# Patient Record
Sex: FEMALE | Race: WHITE | NOT HISPANIC OR LATINO | URBAN - METROPOLITAN AREA
[De-identification: names, ages, dates, MRNs, and addresses within clinical notes are randomized per-mention and may not be internally consistent; named-entity substitution may affect disease eponyms.]

---

## 2021-10-19 ENCOUNTER — OFFICE VISIT (OUTPATIENT)
Dept: URBAN - METROPOLITAN AREA CLINIC 121 | Facility: CLINIC | Age: 66
End: 2021-10-19

## 2021-11-03 ENCOUNTER — APPOINTMENT (RX ONLY)
Dept: URBAN - METROPOLITAN AREA CLINIC 147 | Facility: CLINIC | Age: 66
Setting detail: DERMATOLOGY
End: 2021-11-03

## 2021-11-03 DIAGNOSIS — L82.1 OTHER SEBORRHEIC KERATOSIS: ICD-10-CM

## 2021-11-03 PROCEDURE — ? PRESCRIPTION SAMPLES PROVIDED

## 2021-11-03 PROCEDURE — 99202 OFFICE O/P NEW SF 15 MIN: CPT

## 2021-11-03 PROCEDURE — ? COUNSELING

## 2021-11-03 PROCEDURE — ? ADDITIONAL NOTES

## 2021-11-03 ASSESSMENT — LOCATION SIMPLE DESCRIPTION DERM
LOCATION SIMPLE: RIGHT CHEEK
LOCATION SIMPLE: RIGHT HAND

## 2021-11-03 ASSESSMENT — LOCATION ZONE DERM
LOCATION ZONE: FACE
LOCATION ZONE: HAND

## 2021-11-03 ASSESSMENT — LOCATION DETAILED DESCRIPTION DERM
LOCATION DETAILED: RIGHT RADIAL DORSAL HAND
LOCATION DETAILED: RIGHT SUPERIOR LATERAL MALAR CHEEK

## 2022-02-28 ENCOUNTER — OFFICE VISIT (OUTPATIENT)
Dept: URBAN - METROPOLITAN AREA CLINIC 63 | Facility: CLINIC | Age: 67
End: 2022-02-28

## 2022-04-05 ENCOUNTER — OFFICE VISIT (OUTPATIENT)
Dept: URBAN - METROPOLITAN AREA SURGERY CENTER 4 | Facility: SURGERY CENTER | Age: 67
End: 2022-04-05

## 2022-04-19 ENCOUNTER — OFFICE VISIT (OUTPATIENT)
Dept: URBAN - METROPOLITAN AREA CLINIC 63 | Facility: CLINIC | Age: 67
End: 2022-04-19

## 2022-05-20 ENCOUNTER — OFFICE VISIT (OUTPATIENT)
Dept: URBAN - METROPOLITAN AREA SURGERY CENTER 4 | Facility: SURGERY CENTER | Age: 67
End: 2022-05-20

## 2022-06-01 ENCOUNTER — OFFICE VISIT (OUTPATIENT)
Dept: URBAN - METROPOLITAN AREA SURGERY CENTER 4 | Facility: SURGERY CENTER | Age: 67
End: 2022-06-01

## 2022-06-03 ENCOUNTER — OFFICE VISIT (OUTPATIENT)
Dept: URBAN - METROPOLITAN AREA CLINIC 63 | Facility: CLINIC | Age: 67
End: 2022-06-03

## 2022-06-15 ENCOUNTER — OFFICE VISIT (OUTPATIENT)
Dept: URBAN - METROPOLITAN AREA CLINIC 63 | Facility: CLINIC | Age: 67
End: 2022-06-15

## 2022-06-27 ENCOUNTER — OFFICE VISIT (OUTPATIENT)
Dept: URBAN - METROPOLITAN AREA CLINIC 63 | Facility: CLINIC | Age: 67
End: 2022-06-27

## 2022-07-09 ENCOUNTER — TELEPHONE ENCOUNTER (OUTPATIENT)
Dept: URBAN - METROPOLITAN AREA CLINIC 121 | Facility: CLINIC | Age: 67
End: 2022-07-09

## 2022-07-09 RX ORDER — PROMETHAZINE HYDROCHLORIDE 25 MG/1
TABLET ORAL AS NEEDED
Refills: 0 | OUTPATIENT
Start: 2020-02-17 | End: 2022-02-28

## 2022-07-09 RX ORDER — BISMUTH SUBSALICYLATE 262MG/15ML
SUSPENSION, ORAL (FINAL DOSE FORM) ORAL AS NEEDED
Refills: 0 | OUTPATIENT
Start: 2020-02-17 | End: 2022-02-28

## 2022-07-09 RX ORDER — OMEPRAZOLE 20 MG/1
CAPSULE, DELAYED RELEASE ORAL ONCE A DAY
Refills: 0 | OUTPATIENT
Start: 2020-02-17 | End: 2022-02-28

## 2022-07-10 ENCOUNTER — TELEPHONE ENCOUNTER (OUTPATIENT)
Dept: URBAN - METROPOLITAN AREA CLINIC 121 | Facility: CLINIC | Age: 67
End: 2022-07-10

## 2022-07-10 RX ORDER — HYOSCYAMINE SULFATE 0.12 MG/1
TAKE AS DIRECTED TAKE 1-2 TABS, SL, UP TO 3 TIMES DAILY, AS NEEDED FOR ABDOMINAL PAIN/CRAMPING TABLET, ORALLY DISINTEGRATING ORAL TAKE AS DIRECTED
Refills: 1 | Status: ACTIVE | COMMUNITY
Start: 2020-02-17

## 2022-07-10 RX ORDER — HYDROCORTISONE ACETATE 25 MG/1
INSERT ONE SUPPOSITORY PER RECTUM TWICE DAILY AS NEEDED SUPPOSITORY RECTAL TWICE A DAY
Refills: 1 | Status: ACTIVE | COMMUNITY
Start: 2010-08-27

## 2022-07-10 RX ORDER — LOSARTAN POTASSIUM 50 MG/1
TABLET, FILM COATED ORAL ONCE A DAY
Refills: 0 | Status: ACTIVE | COMMUNITY
Start: 2022-02-28

## 2022-07-10 RX ORDER — HYDROCORTISONE ACETATE 25 MG/1
TWICE A DAY INSERT ONE SUPP, PR, QAM AND QHS AFTER BOWEL EVACUATION SUPPOSITORY RECTAL TWICE A DAY
Refills: 2 | Status: ACTIVE | COMMUNITY
Start: 2020-08-03

## 2022-07-10 RX ORDER — PROMETHAZINE HYDROCHLORIDE 12.5 MG/1
TABLET ORAL AS NEEDED
Refills: 0 | Status: ACTIVE | COMMUNITY
Start: 2022-02-28

## 2022-07-10 RX ORDER — HYDROCORTISONE ACETATE 25 MG/1
INSERT ONE SUPPOSITORY PER RECTUM TWICE DAILY AS NEEDED SUPPOSITORY RECTAL TWICE A DAY
Refills: 0 | Status: ACTIVE | COMMUNITY
Start: 2012-11-28

## 2022-07-10 RX ORDER — OMEPRAZOLE 20 MG/1
TABLET, ORALLY DISINTEGRATING, DELAYED RELEASE ORAL ONCE A DAY
Refills: 0 | Status: ACTIVE | COMMUNITY
Start: 2022-02-28

## 2022-08-08 ENCOUNTER — APPOINTMENT (RX ONLY)
Dept: URBAN - METROPOLITAN AREA CLINIC 147 | Facility: CLINIC | Age: 67
Setting detail: DERMATOLOGY
End: 2022-08-08

## 2022-08-08 DIAGNOSIS — L81.5 LEUKODERMA, NOT ELSEWHERE CLASSIFIED: ICD-10-CM

## 2022-08-08 PROCEDURE — 99212 OFFICE O/P EST SF 10 MIN: CPT

## 2022-08-08 PROCEDURE — ? COUNSELING

## 2022-08-08 ASSESSMENT — LOCATION DETAILED DESCRIPTION DERM
LOCATION DETAILED: LEFT POSTERIOR SHOULDER
LOCATION DETAILED: RIGHT POSTERIOR SHOULDER
LOCATION DETAILED: UPPER STERNUM
LOCATION DETAILED: RIGHT PROXIMAL PRETIBIAL REGION
LOCATION DETAILED: LEFT DISTAL PRETIBIAL REGION

## 2022-08-08 ASSESSMENT — LOCATION SIMPLE DESCRIPTION DERM
LOCATION SIMPLE: CHEST
LOCATION SIMPLE: LEFT PRETIBIAL REGION
LOCATION SIMPLE: LEFT SHOULDER
LOCATION SIMPLE: RIGHT PRETIBIAL REGION
LOCATION SIMPLE: RIGHT SHOULDER

## 2022-08-08 ASSESSMENT — LOCATION ZONE DERM
LOCATION ZONE: ARM
LOCATION ZONE: TRUNK
LOCATION ZONE: LEG

## 2022-11-17 ENCOUNTER — APPOINTMENT (RX ONLY)
Dept: URBAN - METROPOLITAN AREA CLINIC 147 | Facility: CLINIC | Age: 67
Setting detail: DERMATOLOGY
End: 2022-11-17

## 2022-11-17 DIAGNOSIS — L98.8 OTHER SPECIFIED DISORDERS OF THE SKIN AND SUBCUTANEOUS TISSUE: ICD-10-CM

## 2022-11-17 DIAGNOSIS — D18.0 HEMANGIOMA: ICD-10-CM

## 2022-11-17 DIAGNOSIS — L82.1 OTHER SEBORRHEIC KERATOSIS: ICD-10-CM

## 2022-11-17 DIAGNOSIS — L81.4 OTHER MELANIN HYPERPIGMENTATION: ICD-10-CM

## 2022-11-17 DIAGNOSIS — D22 MELANOCYTIC NEVI: ICD-10-CM

## 2022-11-17 PROBLEM — D48.5 NEOPLASM OF UNCERTAIN BEHAVIOR OF SKIN: Status: ACTIVE | Noted: 2022-11-17

## 2022-11-17 PROBLEM — D18.01 HEMANGIOMA OF SKIN AND SUBCUTANEOUS TISSUE: Status: ACTIVE | Noted: 2022-11-17

## 2022-11-17 PROBLEM — D22.5 MELANOCYTIC NEVI OF TRUNK: Status: ACTIVE | Noted: 2022-11-17

## 2022-11-17 PROCEDURE — 99213 OFFICE O/P EST LOW 20 MIN: CPT | Mod: 25

## 2022-11-17 PROCEDURE — ? FULL BODY SKIN EXAM

## 2022-11-17 PROCEDURE — ? COUNSELING

## 2022-11-17 PROCEDURE — ? BIOPSY BY SHAVE METHOD

## 2022-11-17 PROCEDURE — 11102 TANGNTL BX SKIN SINGLE LES: CPT

## 2022-11-17 PROCEDURE — ? ADDITIONAL NOTES

## 2022-11-17 PROCEDURE — ? TREATMENT REGIMEN

## 2022-11-17 ASSESSMENT — LOCATION DETAILED DESCRIPTION DERM
LOCATION DETAILED: RIGHT MEDIAL PLANTAR 1ST TOE
LOCATION DETAILED: LEFT ANTERIOR PROXIMAL THIGH
LOCATION DETAILED: RIGHT MEDIAL UPPER BACK
LOCATION DETAILED: PERIUMBILICAL SKIN
LOCATION DETAILED: INFERIOR THORACIC SPINE
LOCATION DETAILED: LEFT VENTRAL PROXIMAL FOREARM
LOCATION DETAILED: RIGHT ANTERIOR SHOULDER
LOCATION DETAILED: RIGHT VENTRAL PROXIMAL FOREARM

## 2022-11-17 ASSESSMENT — LOCATION SIMPLE DESCRIPTION DERM
LOCATION SIMPLE: RIGHT FOREARM
LOCATION SIMPLE: UPPER BACK
LOCATION SIMPLE: RIGHT SHOULDER
LOCATION SIMPLE: LEFT THIGH
LOCATION SIMPLE: LEFT FOREARM
LOCATION SIMPLE: PLANTAR SURFACE OF RIGHT 1ST TOE
LOCATION SIMPLE: RIGHT UPPER BACK
LOCATION SIMPLE: ABDOMEN

## 2022-11-17 ASSESSMENT — LOCATION ZONE DERM
LOCATION ZONE: LEG
LOCATION ZONE: TOE
LOCATION ZONE: ARM
LOCATION ZONE: TRUNK

## 2022-11-17 NOTE — PROCEDURE: ADDITIONAL NOTES
Render Risk Assessment In Note?: no
Detail Level: Simple
Additional Notes: Small fissure noted. Discussed importance of keeping feet moisturized with Vaseline and to RTO if not improved.

## 2023-06-29 ENCOUNTER — APPOINTMENT (RX ONLY)
Dept: URBAN - METROPOLITAN AREA CLINIC 147 | Facility: CLINIC | Age: 68
Setting detail: DERMATOLOGY
End: 2023-06-29

## 2023-06-29 DIAGNOSIS — L20.89 OTHER ATOPIC DERMATITIS: ICD-10-CM

## 2023-06-29 PROBLEM — L30.9 DERMATITIS, UNSPECIFIED: Status: ACTIVE | Noted: 2023-06-29

## 2023-06-29 PROCEDURE — ? COUNSELING

## 2023-06-29 PROCEDURE — 99213 OFFICE O/P EST LOW 20 MIN: CPT

## 2023-06-29 PROCEDURE — ? PRESCRIPTION MEDICATION MANAGEMENT

## 2023-06-29 PROCEDURE — ? PRESCRIPTION

## 2023-06-29 PROCEDURE — ? MEDICATION COUNSELING

## 2023-06-29 PROCEDURE — ? ADDITIONAL NOTES

## 2023-06-29 RX ORDER — TRIAMCINOLONE ACETONIDE 1 MG/G
CREAM TOPICAL BID
Qty: 80 | Refills: 0 | Status: ERX | COMMUNITY
Start: 2023-06-29

## 2023-06-29 RX ADMIN — TRIAMCINOLONE ACETONIDE: 1 CREAM TOPICAL at 00:00

## 2023-06-29 ASSESSMENT — LOCATION ZONE DERM
LOCATION ZONE: TRUNK
LOCATION ZONE: AXILLAE

## 2023-06-29 ASSESSMENT — LOCATION SIMPLE DESCRIPTION DERM
LOCATION SIMPLE: RIGHT LOWER BACK
LOCATION SIMPLE: RIGHT AXILLARY VAULT
LOCATION SIMPLE: ABDOMEN

## 2023-06-29 ASSESSMENT — LOCATION DETAILED DESCRIPTION DERM
LOCATION DETAILED: PERIUMBILICAL SKIN
LOCATION DETAILED: RIGHT INFERIOR MEDIAL MIDBACK
LOCATION DETAILED: RIGHT AXILLARY VAULT

## 2023-06-29 NOTE — PROCEDURE: ADDITIONAL NOTES
Detail Level: Simple
Render Risk Assessment In Note?: no
Additional Notes: Pt had two injections of Prolia and recently noticed erythematous papules and scaly patches x 1 week. Discussed possibility of drug eruption. Pt declined bx at todays visit but will consider if not resolved at next visit. Advised to rto if rash worsens prior to next scheduled visit. Pt denies facial, tongue or throat swelling. Pt denies gi symptoms, trouble breathing or swallowing.

## 2023-06-29 NOTE — PROCEDURE: PRESCRIPTION MEDICATION MANAGEMENT
Render In Strict Bullet Format?: No
Initiate Treatment: Triamcinolone bid x 2 weeks
Discontinue Regimen: Clotrimazole-betamethasone cream
Detail Level: Zone

## 2023-06-29 NOTE — PROCEDURE: MEDICATION COUNSELING
Xelaldoz Pregnancy And Lactation Text: This medication is Pregnancy Category D and is not considered safe during pregnancy.  The risk during breast feeding is also uncertain.

## 2023-07-20 ENCOUNTER — APPOINTMENT (RX ONLY)
Dept: URBAN - METROPOLITAN AREA CLINIC 147 | Facility: CLINIC | Age: 68
Setting detail: DERMATOLOGY
End: 2023-07-20

## 2023-07-20 DIAGNOSIS — L20.89 OTHER ATOPIC DERMATITIS: ICD-10-CM

## 2023-07-20 PROBLEM — L30.9 DERMATITIS, UNSPECIFIED: Status: ACTIVE | Noted: 2023-07-20

## 2023-07-20 PROCEDURE — ? PRESCRIPTION MEDICATION MANAGEMENT

## 2023-07-20 PROCEDURE — 11102 TANGNTL BX SKIN SINGLE LES: CPT

## 2023-07-20 PROCEDURE — ? BIOPSY BY SHAVE METHOD

## 2023-07-20 PROCEDURE — ? PRESCRIPTION

## 2023-07-20 PROCEDURE — ? COUNSELING

## 2023-07-20 RX ORDER — KETOCONAZOLE 20 MG/G
CREAM TOPICAL
Qty: 30 | Refills: 0 | Status: ERX | COMMUNITY
Start: 2023-07-20

## 2023-07-20 RX ADMIN — KETOCONAZOLE: 20 CREAM TOPICAL at 00:00

## 2023-07-20 ASSESSMENT — LOCATION DETAILED DESCRIPTION DERM
LOCATION DETAILED: PERIUMBILICAL SKIN
LOCATION DETAILED: LEFT SUPERIOR LATERAL MIDBACK
LOCATION DETAILED: RIGHT INFERIOR MEDIAL MIDBACK
LOCATION DETAILED: RIGHT AXILLARY VAULT

## 2023-07-20 ASSESSMENT — LOCATION ZONE DERM
LOCATION ZONE: TRUNK
LOCATION ZONE: AXILLAE

## 2023-07-20 ASSESSMENT — LOCATION SIMPLE DESCRIPTION DERM
LOCATION SIMPLE: RIGHT LOWER BACK
LOCATION SIMPLE: ABDOMEN
LOCATION SIMPLE: LEFT LOWER BACK
LOCATION SIMPLE: RIGHT AXILLARY VAULT

## 2023-07-20 NOTE — PROCEDURE: PRESCRIPTION MEDICATION MANAGEMENT
Render In Strict Bullet Format?: No
Initiate Treatment: Triamcinolone bid x 2 weeks\\nKetoconazole bid x 2 weeks
Discontinue Regimen: Clotrimazole-betamethasone cream
Detail Level: Zone

## 2023-08-01 RX ORDER — KETOCONAZOLE 20 MG/G
CREAM TOPICAL
Qty: 30 | Refills: 0 | Status: ERX

## 2023-08-01 RX ORDER — TRIAMCINOLONE ACETONIDE 1 MG/G
CREAM TOPICAL BID
Qty: 80 | Refills: 0 | Status: ERX

## 2023-08-10 ENCOUNTER — APPOINTMENT (RX ONLY)
Dept: URBAN - METROPOLITAN AREA CLINIC 147 | Facility: CLINIC | Age: 68
Setting detail: DERMATOLOGY
End: 2023-08-10

## 2023-08-10 DIAGNOSIS — L20.89 OTHER ATOPIC DERMATITIS: ICD-10-CM

## 2023-08-10 PROCEDURE — ? ADDITIONAL NOTES

## 2023-08-10 PROCEDURE — ? MEDICATION COUNSELING

## 2023-08-10 PROCEDURE — ? PRESCRIPTION MEDICATION MANAGEMENT

## 2023-08-10 PROCEDURE — ? COUNSELING

## 2023-08-10 PROCEDURE — 99213 OFFICE O/P EST LOW 20 MIN: CPT

## 2023-08-10 PROCEDURE — ? PATHOLOGY DISCUSSION

## 2023-08-10 PROCEDURE — ? PRESCRIPTION

## 2023-08-10 RX ORDER — BETAMETHASONE DIPROPIONATE 0.5 MG/G
CREAM TOPICAL
Qty: 45 | Refills: 0 | Status: ERX | COMMUNITY
Start: 2023-08-10

## 2023-08-10 RX ADMIN — BETAMETHASONE DIPROPIONATE: 0.5 CREAM TOPICAL at 00:00

## 2023-08-10 ASSESSMENT — LOCATION ZONE DERM: LOCATION ZONE: TRUNK

## 2023-08-10 ASSESSMENT — LOCATION DETAILED DESCRIPTION DERM: LOCATION DETAILED: LEFT SUPERIOR LATERAL MIDBACK

## 2023-08-10 ASSESSMENT — LOCATION SIMPLE DESCRIPTION DERM: LOCATION SIMPLE: LEFT LOWER BACK

## 2023-08-10 NOTE — PROCEDURE: PRESCRIPTION MEDICATION MANAGEMENT
Discontinue Regimen: Ketoconazole cream / Collagen / Biotin
Detail Level: Zone
Initiate Treatment: Xyzal / Betamethasone bid x 2 weeks
Render In Strict Bullet Format?: No

## 2023-08-10 NOTE — PROCEDURE: ADDITIONAL NOTES
Render Risk Assessment In Note?: no
Detail Level: Simple
Additional Notes: Pt states she started taking collagen pills few days before rash started. Advised to discontinue \\nDiscussed omeprazole also could causing reaction, advised to not d/c without speaking to prescriber first. \\nDiscussed options of prednisone, will continue with topicals if no improvement will consider prednisone.\\n\\nRecommended allergy testing if not improved with medication changes

## 2024-11-05 ENCOUNTER — OFFICE VISIT (OUTPATIENT)
Dept: URBAN - METROPOLITAN AREA CLINIC 63 | Facility: CLINIC | Age: 69
End: 2024-11-05
Payer: MEDICARE

## 2024-11-05 VITALS
WEIGHT: 125 LBS | SYSTOLIC BLOOD PRESSURE: 130 MMHG | HEIGHT: 60 IN | HEART RATE: 94 BPM | OXYGEN SATURATION: 99 % | DIASTOLIC BLOOD PRESSURE: 90 MMHG | BODY MASS INDEX: 24.54 KG/M2 | TEMPERATURE: 97.8 F

## 2024-11-05 DIAGNOSIS — K21.9 GASTROESOPHAGEAL REFLUX DISEASE, UNSPECIFIED WHETHER ESOPHAGITIS PRESENT: ICD-10-CM

## 2024-11-05 PROBLEM — 235595009: Status: ACTIVE | Noted: 2024-11-05

## 2024-11-05 PROCEDURE — 99213 OFFICE O/P EST LOW 20 MIN: CPT

## 2024-11-05 RX ORDER — PANTOPRAZOLE SODIUM 40 MG/1
1 TABLET 1/2 TO 1 HOUR BEFORE MORNING MEAL TABLET, DELAYED RELEASE ORAL ONCE A DAY
Status: ACTIVE | COMMUNITY

## 2024-11-05 RX ORDER — FAMOTIDINE 20 MG/1
1 TABLET AT BEDTIME AS NEEDED TABLET, FILM COATED ORAL ONCE A DAY
Status: ACTIVE | COMMUNITY

## 2024-11-05 RX ORDER — RISEDRONATE SODIUM 35 MG/1
TAKE 1 TABLET BY MOUTH ONCE A WEEK 30 MINUTES BEFORE FIRST FOOD/DRINK/MEDICATION. AVOID LYING DOWN FOR 30 MINUTES TABLET, FILM COATED ORAL
Qty: 12 EACH | Refills: 0 | Status: ACTIVE | COMMUNITY

## 2024-11-05 RX ORDER — SUCRALFATE 1 G/1
1 TABLET ON AN EMPTY STOMACH TABLET ORAL TWICE A DAY
Status: ON HOLD | COMMUNITY

## 2024-11-05 RX ORDER — PROMETHAZINE HYDROCHLORIDE 25 MG/1
1 TABLET AS NEEDED TABLET ORAL
Status: ON HOLD | COMMUNITY

## 2024-11-05 RX ORDER — OMEPRAZOLE 20 MG/1
TAKE 1 CAPSULE BY MOUTH ONCE DAILY CAPSULE, DELAYED RELEASE ORAL
Qty: 90 EACH | Refills: 3 | Status: ON HOLD | COMMUNITY

## 2024-11-05 RX ORDER — LOSARTAN POTASSIUM 50 MG/1
TAKE 1 TABLET BY MOUTH ONCE DAILY TABLET, FILM COATED ORAL
Qty: 90 EACH | Refills: 3 | Status: ACTIVE | COMMUNITY

## 2024-11-05 NOTE — PHYSICAL EXAM CONSTITUTIONAL:
well developed, well nourished , in no acute distress , ambulating without difficulty , normal communication ability PATIENT APPEARED ANXIOUS AND IN MILD DISTRESS DURING INTERVIEW

## 2024-11-05 NOTE — HPI-TODAY'S VISIT:
This is a very pleasant 69-year-old female who presents to the office with a chief complaint of "acid reflux".  Past medical history is significant for hypertension, osteoarthritis, GERD.  Past surgical history is significant for appendectomy, colonoscopy,  x2, ORIF of the wrist, lateral internal sphincterotomy, excision of rectal lesion.  Family history is significant for maternal ovarian cancer, matneral grandmother with CRC and paternal colon polyps. Last colonoscopy 2022, Dr. Santos, repeat colonoscopy is deferred due to age. Patient is endoscopy naive. Cardiologist: none. . Patient was last seen in the office on 3/2/2022 with JR Cox for lower abdominal pain and screening colonoscopy.  Patient explained at that visit her last complete colonoscopy was in  which was normal except for tortuosity.  She relates a family history of colon cancer in a maternal grandmother and paternal history of colon polyps. . Patient presents today explaining that she is in significant pain and looked visibly distressed during the interview.  She explained  that she recently saw her PCP who diagnosed her with a she explains that she has been having reflux that will cause her epigastric pain as well as heartburn UTI and gave her empiric antibiotics.  She explained in the visit that she began taking these antibiotics and was significantly better, however took only a few doses before discontinuing.  She explains that the same pain has now returned and seems to have gotten worse.  I explained to the patient that she should present to the ED to have evaluation, she denied EMS services at today's visit. . Patient explains that she has been having heartburn as well as epigastric pain that radiates to her back.  She explains she has been under significant stress since her  passed away several months ago, and explains she has "not been the same since".  She explained her PCP gave her famotidine 20 mg and omeprazole 40 mg to take daily which has significantly helped her symptoms, she has been taking both for a few weeks.  I explained to the patient she should take a full 2-month trial of omeprazole 40mg daily and we can meet up in a few weeks to assess for improvement.  She explains she will present to the ED for evaluation for UTI after our visit. . Patient denies belching, bloating, constipation, diarrhea, dysphagia, pyrosis, melena, hematochezia, hematemesis, nausea, vomiting, BRBPR, and unintentional weight loss.

## 2024-11-14 ENCOUNTER — TELEPHONE ENCOUNTER (OUTPATIENT)
Dept: URBAN - METROPOLITAN AREA CLINIC 7 | Facility: CLINIC | Age: 69
End: 2024-11-14

## 2024-11-15 ENCOUNTER — LAB OUTSIDE AN ENCOUNTER (OUTPATIENT)
Dept: URBAN - METROPOLITAN AREA CLINIC 63 | Facility: CLINIC | Age: 69
End: 2024-11-15

## 2024-11-15 ENCOUNTER — DASHBOARD ENCOUNTERS (OUTPATIENT)
Age: 69
End: 2024-11-15

## 2024-11-15 ENCOUNTER — OFFICE VISIT (OUTPATIENT)
Dept: URBAN - METROPOLITAN AREA CLINIC 63 | Facility: CLINIC | Age: 69
End: 2024-11-15
Payer: MEDICARE

## 2024-11-15 VITALS
HEART RATE: 98 BPM | TEMPERATURE: 97.9 F | BODY MASS INDEX: 24.74 KG/M2 | HEIGHT: 60 IN | SYSTOLIC BLOOD PRESSURE: 140 MMHG | WEIGHT: 126 LBS | DIASTOLIC BLOOD PRESSURE: 100 MMHG | OXYGEN SATURATION: 98 %

## 2024-11-15 DIAGNOSIS — K21.9 GASTROESOPHAGEAL REFLUX DISEASE, UNSPECIFIED WHETHER ESOPHAGITIS PRESENT: ICD-10-CM

## 2024-11-15 DIAGNOSIS — R10.13 EPIGASTRIC ABDOMINAL PAIN: ICD-10-CM

## 2024-11-15 PROCEDURE — 99213 OFFICE O/P EST LOW 20 MIN: CPT

## 2024-11-15 RX ORDER — FAMOTIDINE 20 MG/1
1 TABLET AT BEDTIME AS NEEDED TABLET, FILM COATED ORAL ONCE A DAY
Status: ACTIVE | COMMUNITY

## 2024-11-15 RX ORDER — LOSARTAN POTASSIUM 50 MG/1
TAKE 1 TABLET BY MOUTH ONCE DAILY TABLET, FILM COATED ORAL
Qty: 90 EACH | Refills: 3 | Status: ACTIVE | COMMUNITY

## 2024-11-15 RX ORDER — RISEDRONATE SODIUM 35 MG/1
TAKE 1 TABLET BY MOUTH ONCE A WEEK 30 MINUTES BEFORE FIRST FOOD/DRINK/MEDICATION. AVOID LYING DOWN FOR 30 MINUTES TABLET, FILM COATED ORAL
Qty: 12 EACH | Refills: 0 | Status: ACTIVE | COMMUNITY

## 2024-11-15 RX ORDER — PROMETHAZINE HYDROCHLORIDE 25 MG/1
1 TABLET AS NEEDED TABLET ORAL
Status: ON HOLD | COMMUNITY

## 2024-11-15 RX ORDER — OMEPRAZOLE 20 MG/1
TAKE 1 CAPSULE BY MOUTH ONCE DAILY CAPSULE, DELAYED RELEASE ORAL
Qty: 90 EACH | Refills: 3 | Status: ON HOLD | COMMUNITY

## 2024-11-15 RX ORDER — PANTOPRAZOLE SODIUM 40 MG/1
1 TABLET 1/2 TO 1 HOUR BEFORE MORNING MEAL TABLET, DELAYED RELEASE ORAL ONCE A DAY
Status: ACTIVE | COMMUNITY

## 2024-11-15 RX ORDER — SUCRALFATE 1 G/1
1 TABLET ON AN EMPTY STOMACH TABLET ORAL TWICE A DAY
Status: ON HOLD | COMMUNITY

## 2024-11-15 NOTE — HPI-TODAY'S VISIT:
This is a very pleasant 69-year-old female who presents to the office with a chief complaint of "GERD".  Past medical history is significant for hypertension, osteoarthritis, GERD.  Past surgical history is significant for appendectomy, colonoscopy,  x2, ORIF of the wrist, lateral internal sphincterotomy, excision of rectal lesion.  Family history is significant for maternal ovarian cancer, matneral grandmother with CRC and paternal colon polyps. Last colonoscopy 2022, Dr. Santos, no recall indicated due to age Patient is endoscopy naive. Cardiologist: none. . Patient was last seen in the office on 2024 for reflux.  At that visit patient was looking visibly distressed in the interview, she explained that her PCP had diagnosed her with a presumed UTI and had given her empiric antibiotics, and the visit she explains she was feeling significantly better after the fevers few doses, so she discontinued it.  She explains that the pain has returned during the interview, I recommended the patient present to the ED for evaluation, patient denied EMS services at the visit.  She explains that she has been having heartburn as well as epigastric pain that radiates to her back, she notes significant stress since her  passed away several months ago.  PCP had given her famotidine 20 mg and omeprazole 40 mg daily which improved her symptoms, I explained we should try a full 2-month trial of omeprazole 40mg daily and the famotidine 20mg daily. Patient was amenable. . Patient presents today explaining that she went to [facility] at last visit for UTI, although she explains that her pain from her UTI has completely resolved. She continues to have burning in her chest, and on certain days is being woken up with nausea and dry heaving.  She explains that due to the dry heaving she is now having shoulder and back pain.  She explains she has been taking the pantoprazole 40mg in the AM and famotidine 20mg at night, but she is still having symptoms.  She explains that the symptoms become noticeably worse when she eats pasta, chili or anything tomato-based at dinner, as she will then have symptoms early the next morning of nausea and vomiting.  I explained to the patient that because her GERD symptoms have been refractory to high-dose PPI and famotidine daily treatment in 2024, EGD would be indicated.  Of note, patient still explains that she has been experiencing grief and anxiety due to her 's passing earlier this year, she explains that over holidays her stress becomes worse.  I explained to the patient that because stress can make GERD worse it may be helpful to establish with a talk therapist, I gave her recommendation in office today.  Patient is agreeable to an EGD for evaluation.  In addition I increased her dose to pantoprazole 40mg BID and famotidine 20mg as needed, to a max of BID. CT A&P also ordered. . Patient denies abdominal pain, belching, bloating, constipation, diarrhea, dysphagia, melena, hematochezia, hematemesis, BRBPR, and unintentional weight loss.

## 2024-11-19 ENCOUNTER — TELEPHONE ENCOUNTER (OUTPATIENT)
Dept: URBAN - METROPOLITAN AREA CLINIC 63 | Facility: CLINIC | Age: 69
End: 2024-11-19

## 2024-11-20 ENCOUNTER — CLAIMS CREATED FROM THE CLAIM WINDOW (OUTPATIENT)
Dept: URBAN - METROPOLITAN AREA SURGERY CENTER 4 | Facility: SURGERY CENTER | Age: 69
End: 2024-11-20
Payer: MEDICARE

## 2024-11-20 ENCOUNTER — CLAIMS CREATED FROM THE CLAIM WINDOW (OUTPATIENT)
Dept: URBAN - METROPOLITAN AREA CLINIC 4 | Facility: CLINIC | Age: 69
End: 2024-11-20
Payer: MEDICARE

## 2024-11-20 DIAGNOSIS — K44.9 HIATAL HERNIA: ICD-10-CM

## 2024-11-20 DIAGNOSIS — K31.7 GASTRIC POLYPS: ICD-10-CM

## 2024-11-20 DIAGNOSIS — K44.9 DIAPHRAGMATIC HERNIA WITHOUT OBSTRUCTION OR GANGRENE: ICD-10-CM

## 2024-11-20 DIAGNOSIS — K31.7 POLYP OF STOMACH AND DUODENUM: ICD-10-CM

## 2024-11-20 DIAGNOSIS — K31.89 OTHER DISEASES OF STOMACH AND DUODENUM: ICD-10-CM

## 2024-11-20 DIAGNOSIS — K29.70 GASTRITIS, UNSPECIFIED, WITHOUT BLEEDING: ICD-10-CM

## 2024-11-20 DIAGNOSIS — K29.70 GASTRITIS WITHOUT BLEEDING, UNSPECIFIED CHRONICITY, UNSPECIFIED GASTRITIS TYPE: ICD-10-CM

## 2024-11-20 DIAGNOSIS — K29.70 ERYTHEMATOUS GASTROPATHY: ICD-10-CM

## 2024-11-20 PROCEDURE — 43239 EGD BIOPSY SINGLE/MULTIPLE: CPT | Performed by: CLINIC/CENTER

## 2024-11-20 PROCEDURE — 43239 EGD BIOPSY SINGLE/MULTIPLE: CPT | Performed by: INTERNAL MEDICINE

## 2024-11-20 PROCEDURE — 00731 ANES UPR GI NDSC PX NOS: CPT | Performed by: NURSE ANESTHETIST, CERTIFIED REGISTERED

## 2024-11-20 PROCEDURE — 88305 TISSUE EXAM BY PATHOLOGIST: CPT | Performed by: STUDENT IN AN ORGANIZED HEALTH CARE EDUCATION/TRAINING PROGRAM

## 2024-11-20 RX ORDER — PROMETHAZINE HYDROCHLORIDE 25 MG/1
1 TABLET AS NEEDED TABLET ORAL
Status: ON HOLD | COMMUNITY

## 2024-11-20 RX ORDER — LOSARTAN POTASSIUM 50 MG/1
TAKE 1 TABLET BY MOUTH ONCE DAILY TABLET, FILM COATED ORAL
Qty: 90 EACH | Refills: 3 | Status: ACTIVE | COMMUNITY

## 2024-11-20 RX ORDER — SUCRALFATE 1 G/1
1 TABLET ON AN EMPTY STOMACH TABLET ORAL TWICE A DAY
Status: ON HOLD | COMMUNITY

## 2024-11-20 RX ORDER — PANTOPRAZOLE SODIUM 40 MG/1
1 TABLET 1/2 TO 1 HOUR BEFORE MORNING MEAL TABLET, DELAYED RELEASE ORAL ONCE A DAY
Status: ACTIVE | COMMUNITY

## 2024-11-20 RX ORDER — OMEPRAZOLE 20 MG/1
TAKE 1 CAPSULE BY MOUTH ONCE DAILY CAPSULE, DELAYED RELEASE ORAL
Qty: 90 EACH | Refills: 3 | Status: ON HOLD | COMMUNITY

## 2024-11-20 RX ORDER — FAMOTIDINE 20 MG/1
1 TABLET AT BEDTIME AS NEEDED TABLET, FILM COATED ORAL ONCE A DAY
Status: ACTIVE | COMMUNITY

## 2024-11-20 RX ORDER — RISEDRONATE SODIUM 35 MG/1
TAKE 1 TABLET BY MOUTH ONCE A WEEK 30 MINUTES BEFORE FIRST FOOD/DRINK/MEDICATION. AVOID LYING DOWN FOR 30 MINUTES TABLET, FILM COATED ORAL
Qty: 12 EACH | Refills: 0 | Status: ACTIVE | COMMUNITY

## 2024-12-06 ENCOUNTER — OFFICE VISIT (OUTPATIENT)
Dept: URBAN - METROPOLITAN AREA CLINIC 63 | Facility: CLINIC | Age: 69
End: 2024-12-06

## 2024-12-11 ENCOUNTER — OFFICE VISIT (OUTPATIENT)
Dept: URBAN - METROPOLITAN AREA CLINIC 63 | Facility: CLINIC | Age: 69
End: 2024-12-11
Payer: MEDICARE

## 2024-12-11 VITALS
BODY MASS INDEX: 24.74 KG/M2 | OXYGEN SATURATION: 98 % | DIASTOLIC BLOOD PRESSURE: 90 MMHG | SYSTOLIC BLOOD PRESSURE: 140 MMHG | WEIGHT: 126 LBS | TEMPERATURE: 97.7 F | HEART RATE: 77 BPM | HEIGHT: 60 IN

## 2024-12-11 DIAGNOSIS — K21.9 ACID REFLUX: ICD-10-CM

## 2024-12-11 PROBLEM — 266435005: Status: ACTIVE | Noted: 2024-12-11

## 2024-12-11 PROCEDURE — 99213 OFFICE O/P EST LOW 20 MIN: CPT

## 2024-12-11 RX ORDER — FAMOTIDINE 20 MG/1
1 TABLET AT BEDTIME AS NEEDED TABLET, FILM COATED ORAL ONCE A DAY
Status: ACTIVE | COMMUNITY

## 2024-12-11 RX ORDER — PANTOPRAZOLE SODIUM 20 MG/1
1 TABLET 1/2 TO 1 HOUR BEFORE MORNING MEAL TABLET, DELAYED RELEASE ORAL ONCE A DAY
Qty: 90 TABLET | Refills: 0 | OUTPATIENT
Start: 2024-12-11

## 2024-12-11 RX ORDER — RISEDRONATE SODIUM 35 MG/1
TAKE 1 TABLET BY MOUTH ONCE A WEEK 30 MINUTES BEFORE FIRST FOOD/DRINK/MEDICATION. AVOID LYING DOWN FOR 30 MINUTES TABLET, FILM COATED ORAL
Qty: 12 EACH | Refills: 0 | Status: ACTIVE | COMMUNITY

## 2024-12-11 RX ORDER — PANTOPRAZOLE SODIUM 40 MG/1
1 TABLET 1/2 TO 1 HOUR BEFORE MORNING MEAL TABLET, DELAYED RELEASE ORAL ONCE A DAY
Status: ACTIVE | COMMUNITY

## 2024-12-11 RX ORDER — LOSARTAN POTASSIUM 50 MG/1
TAKE 1 TABLET BY MOUTH ONCE DAILY TABLET, FILM COATED ORAL
Qty: 90 EACH | Refills: 3 | Status: ACTIVE | COMMUNITY

## 2024-12-11 NOTE — HPI-PREVIOUS PROCEDURES
EGD, 11/20/2024, Dr. Santos - A single duodenal polyp.  Resected and retrieved. - Otherwise normal duodenal bulb and second portion of the duodenum.  Biopsy. - Gastritis.  Biopsy. - A few gastric polyps. - 2 cm hiatal hernia. - Normal esophagus.  Biopsy. . EGD pathology report, second part duodenum biopsy; no significant abnormality. - Duodenum, second part polyp; polypoid duodenal mucosa with normal limits.  No dysplasia or malignancy. - Stomach, antrum biopsy; chronic gastritis, nonspecific.  No evidence of H. pylori organisms or intestinal metaplasia.  Negative for dysplasia or malignancy. - Esophagus, distal biopsy; no significant abnormality.

## 2024-12-11 NOTE — HPI-TODAY'S VISIT:
This is a very pleasant 69-year-old female who presents to the office with a chief complaint of "GERD F/U".  Past medical history is significant for hypertension, osteoarthritis, GERD.  Past surgical history is significant for appendectomy, colonoscopy,  x2, ORIF of the wrist, lateral internal sphincterotomy, excision of rectal lesion.  Family history is significant for maternal ovarian cancer, matneral grandmother with CRC and paternal colon polyps. Last colonoscopy 2022, Dr. Santos, no recall indicated due to age Patient is endoscopy naive. Cardiologist: none. . Patient was last in the office on 11/15/2024 for GERD.  She explains at last visit that she continues to have burning in her chest, and on certain days is being woken up with nausea and dry heaving.  She explains that the dry heaving has now turned into shoulder and back pain.  She had been taking pantoprazole for 40 mg in the AM and famotidine 20 mg at night, but still had daily symptoms.  She noted to have worse symptoms after trigger foods like pasta, chili, or anything tomato-based.  Since her GERD is refractory to high-dose PPI and famotidine therapy daily I explained EGD would be indicated.  I increased patient's pantoprazole 40 mg to twice daily and famotidine 20 mg as needed to a max of twice a day.  CT A&P was ordered as well. .  Patient presents today explaining that she tolerated the procedure well and without complications. I reviewed patient's EGD OP and pathology report, as well as her CT A&P, which revealed no acute abnormalities, in detail and answered all questions. Patient explains that she is feeling significantly better, she explains that her nausea and dry heaving have completely resolved.  She explains that she was prescribed sucralfate BID by her PCP, however she felt that it was not helpful and made her significantly constipated, so she discontinued.  In addition she explains that the pantoprazole 40mg BID resolved her symptoms and she did not need to use famotidine 20mg. I explained we can trial going on to a slightly lower dose, and slowly titrate down to the lowest dose that controls her symptoms.  Patient is agreeable to plan.  Will start patient on pantoprazole 40mg daily and pantoprazole 20mg nightly. She will follow up in 1-2 months for recheck. . Patient denies abdominal pain, belching, bloating, constipation, diarrhea, dysphagia, pyrosis, melena, hematochezia, hematemesis, nausea, vomiting, BRBPR, and unintentional weight loss.

## 2025-02-10 ENCOUNTER — OFFICE VISIT (OUTPATIENT)
Dept: URBAN - METROPOLITAN AREA CLINIC 63 | Facility: CLINIC | Age: 70
End: 2025-02-10
Payer: MEDICARE

## 2025-02-10 VITALS
SYSTOLIC BLOOD PRESSURE: 134 MMHG | HEART RATE: 92 BPM | DIASTOLIC BLOOD PRESSURE: 84 MMHG | WEIGHT: 126.6 LBS | HEIGHT: 60 IN | TEMPERATURE: 98 F | BODY MASS INDEX: 24.85 KG/M2 | OXYGEN SATURATION: 98 %

## 2025-02-10 DIAGNOSIS — K21.9 GASTROESOPHAGEAL REFLUX DISEASE WITHOUT ESOPHAGITIS: ICD-10-CM

## 2025-02-10 PROCEDURE — 99213 OFFICE O/P EST LOW 20 MIN: CPT

## 2025-02-10 RX ORDER — RISEDRONATE SODIUM 35 MG/1
TAKE 1 TABLET BY MOUTH ONCE A WEEK 30 MINUTES BEFORE FIRST FOOD/DRINK/MEDICATION. AVOID LYING DOWN FOR 30 MINUTES TABLET, FILM COATED ORAL
Qty: 12 EACH | Refills: 0 | Status: ACTIVE | COMMUNITY

## 2025-02-10 RX ORDER — LOSARTAN POTASSIUM 50 MG/1
TAKE 1 TABLET BY MOUTH ONCE DAILY TABLET, FILM COATED ORAL
Qty: 90 EACH | Refills: 3 | Status: ACTIVE | COMMUNITY

## 2025-02-10 RX ORDER — PANTOPRAZOLE SODIUM 40 MG/1
1 TABLET 1/2 TO 1 HOUR BEFORE MORNING MEAL TABLET, DELAYED RELEASE ORAL ONCE A DAY
Status: ACTIVE | COMMUNITY

## 2025-02-10 RX ORDER — FAMOTIDINE 20 MG/1
1 TABLET AT BEDTIME AS NEEDED TABLET, FILM COATED ORAL ONCE A DAY
Qty: 90 | Refills: 0

## 2025-02-10 RX ORDER — PANTOPRAZOLE SODIUM 20 MG/1
1 TABLET 1/2 TO 1 HOUR BEFORE MORNING MEAL TABLET, DELAYED RELEASE ORAL ONCE A DAY
Qty: 90 | Refills: 0
Start: 2024-12-11

## 2025-02-10 RX ORDER — PANTOPRAZOLE SODIUM 40 MG/1
1 TABLET 1/2 TO 1 HOUR BEFORE MORNING MEAL TABLET, DELAYED RELEASE ORAL ONCE A DAY
Qty: 90 | Refills: 0

## 2025-02-10 RX ORDER — PANTOPRAZOLE SODIUM 20 MG/1
1 TABLET 1/2 TO 1 HOUR BEFORE MORNING MEAL TABLET, DELAYED RELEASE ORAL ONCE A DAY
Qty: 90 TABLET | Refills: 0 | Status: ACTIVE | COMMUNITY
Start: 2024-12-11

## 2025-02-10 RX ORDER — FAMOTIDINE 20 MG/1
1 TABLET AT BEDTIME AS NEEDED TABLET, FILM COATED ORAL ONCE A DAY
Status: ACTIVE | COMMUNITY

## 2025-02-10 NOTE — HPI-TODAY'S VISIT:
This is a very pleasant 69-year-old female who presents to the office with a chief complaint of "GERD F/U".  Past medical history is significant for hypertension, osteoarthritis, GERD.  Past surgical history is significant for appendectomy, colonoscopy,  x2, ORIF of the wrist, lateral internal sphincterotomy, excision of rectal lesion.  Family history is significant for maternal ovarian cancer, maternal grandmother with CRC and paternal colon polyps. Last colonoscopy 2022, Dr. Santos, no recall indicated due to age Patient is endoscopy naive. Cardiologist: none. . Patient was last seen in the office on 2024 for a postop follow-up.  At that visit, patient had tolerated the procedure well without complications.  Patient explained that she was feeling significantly better she explained that her nausea and dry heaving had resolved.  She had been given sucralfate by her PCP but due to side effects discontinued.  She was taking pantoprazole 40mg twice daily.  Patient was educated on slowly tapering to lower dose since her acute symptoms had resolved.  She was amenable to taking pantoprazole 40 mg daily and pantoprazole 20 mg nightly. . Patient presents today explaining she is doing well on pantoprazole 20 mg daily and pantoprazole 40mg nightly., She explains that she will only have breakthrough symptoms if she eats known triggers, specifically oranges which she explains gives her lower abdominal cramping and diarrhea.  She explains that almonds and shrimp also give her the same reactions.  I explained to the patient that this sounds less like GERD and more likely be a food intolerance/allergy.  She explains she is otherwise doing well, I explained that we could trial her on famotidine 20mg instead of pantoprazole 20mg as due to her history of osteoporosis I would like her to be on lower doses of PPIs.  Patient is agreeable and will trial famotidine in place of pantoprazole 20 mg.  She will follow-up in 2 months. . Patient denies abdominal pain, belching, bloating, constipation, diarrhea, dysphagia, pyrosis, melena, hematochezia, hematemesis, nausea, vomiting, BRBPR, and unintentional weight loss.

## 2025-04-14 ENCOUNTER — OFFICE VISIT (OUTPATIENT)
Dept: URBAN - METROPOLITAN AREA CLINIC 63 | Facility: CLINIC | Age: 70
End: 2025-04-14
Payer: MEDICARE

## 2025-04-14 DIAGNOSIS — K21.9 ACID REFLUX: ICD-10-CM

## 2025-04-14 PROCEDURE — 99213 OFFICE O/P EST LOW 20 MIN: CPT

## 2025-04-14 RX ORDER — LOSARTAN POTASSIUM 50 MG/1
TAKE 1 TABLET BY MOUTH ONCE DAILY TABLET, FILM COATED ORAL
Qty: 90 EACH | Refills: 3 | Status: ACTIVE | COMMUNITY

## 2025-04-14 RX ORDER — FAMOTIDINE 20 MG/1
1 TABLET AT BEDTIME AS NEEDED TABLET, FILM COATED ORAL ONCE A DAY
Qty: 90 | Refills: 0 | Status: ACTIVE | COMMUNITY

## 2025-04-14 RX ORDER — FAMOTIDINE 20 MG/1
1 TABLET AT BEDTIME AS NEEDED TABLET, FILM COATED ORAL ONCE A DAY
Qty: 90 | Refills: 3

## 2025-04-14 RX ORDER — RISEDRONATE SODIUM 35 MG/1
1 TABLET AT LEAST 30 MINUTES BEFORE THE FIRST FOOD OR DRINK, OTHER THAN WATER, OF THE DAY TABLET, FILM COATED ORAL
Status: ACTIVE | COMMUNITY

## 2025-04-14 NOTE — HPI-TODAY'S VISIT:
This is a very pleasant 69-year-old female who presents to the office with a chief complaint of "GERD F/U".  Past medical history is significant for hypertension, osteoarthritis, GERD.  Past surgical history is significant for appendectomy, colonoscopy,  x2, ORIF of the wrist, lateral internal sphincterotomy, excision of rectal lesion.  Family history is significant for maternal ovarian cancer, maternal grandmother with CRC and paternal colon polyps. Last colonoscopy 2022, Dr. Santos, no recall indicated due to age Patient is endoscopy naive. Cardiologist: none. . Patient was last seen in the office on 2/10/2025 for a 2-month GERD follow-up.  At last visit, patient explains she was doing well on pantoprazole 20 mg daily and pantoprazole 40 mg nightly.  At this dose she explains she will only have breakthrough symptoms if she eats known triggers specifically oranges which will also give her lower abdominal cramps and diarrhea.  Additionally patient explains this will happen if she eats almonds or strep.  I explained to the patient that this sounds bit more like a food allergy than GERD however patient explains she avoids these foods anyway.  I explained that we can trial her on famotidine 20mg instead of pantoprazole 20mg due to her history of osteoporosis. . Patient presents today explaining she is doing well.  She explains that since she last saw me she has completely weaned herself off of pantoprazole and explains she now takes famotidine 20 mg nightly and Pepto-Bismol as needed.  I explained to the patient she can continue this as this is a good regimen to be off of PPI due to her osteoporosis.  She explains that she continues to avoid trigger foods and does not have breakthrough symptoms on this dose.  I explained to the patient I will give her a year supply today and she can follow-up with me in 1 year or sooner as needed for other issues.  Patient is agreeable and is otherwise doing well. . Patient denies abdominal pain, belching, bloating, constipation, diarrhea, dysphagia, pyrosis, melena, hematochezia, hematemesis, nausea, vomiting, BRBPR, and unintentional weight loss.